# Patient Record
Sex: MALE | Race: BLACK OR AFRICAN AMERICAN | Employment: FULL TIME | URBAN - METROPOLITAN AREA
[De-identification: names, ages, dates, MRNs, and addresses within clinical notes are randomized per-mention and may not be internally consistent; named-entity substitution may affect disease eponyms.]

---

## 2019-11-03 ENCOUNTER — HOSPITAL ENCOUNTER (EMERGENCY)
Age: 49
Discharge: HOME | End: 2019-11-03
Attending: EMERGENCY MEDICINE
Payer: COMMERCIAL

## 2019-11-03 VITALS
OXYGEN SATURATION: 100 % | DIASTOLIC BLOOD PRESSURE: 77 MMHG | WEIGHT: 250 LBS | HEART RATE: 86 BPM | RESPIRATION RATE: 20 BRPM | SYSTOLIC BLOOD PRESSURE: 153 MMHG | HEIGHT: 72 IN | TEMPERATURE: 98.2 F | BODY MASS INDEX: 33.86 KG/M2

## 2019-11-03 DIAGNOSIS — R07.89 ATYPICAL CHEST PAIN: Primary | ICD-10-CM

## 2019-11-03 LAB
ANION GAP SERPL CALC-SCNC: 7 MMOL/L (ref 3–18)
BASOPHILS # BLD: 0 K/UL (ref 0–0.06)
BASOPHILS NFR BLD: 0 % (ref 0–3)
BUN SERPL-MCNC: 10 MG/DL (ref 7–18)
BUN/CREAT SERPL: 10 (ref 12–20)
CALCIUM SERPL-MCNC: 8.7 MG/DL (ref 8.5–10.1)
CHLORIDE SERPL-SCNC: 106 MMOL/L (ref 100–111)
CO2 SERPL-SCNC: 28 MMOL/L (ref 21–32)
CREAT SERPL-MCNC: 0.99 MG/DL (ref 0.6–1.3)
DIFFERENTIAL METHOD BLD: ABNORMAL
EOSINOPHIL # BLD: 0 K/UL (ref 0–0.4)
EOSINOPHIL NFR BLD: 0 % (ref 0–5)
ERYTHROCYTE [DISTWIDTH] IN BLOOD BY AUTOMATED COUNT: 14.9 % (ref 11.6–14.5)
GLUCOSE SERPL-MCNC: 97 MG/DL (ref 74–99)
HCT VFR BLD AUTO: 40.5 % (ref 36–48)
HGB BLD-MCNC: 13 G/DL (ref 13–16)
LYMPHOCYTES # BLD: 5.6 K/UL (ref 0.8–3.5)
LYMPHOCYTES NFR BLD: 54 % (ref 20–51)
MCH RBC QN AUTO: 24.9 PG (ref 24–34)
MCHC RBC AUTO-ENTMCNC: 32.1 G/DL (ref 31–37)
MCV RBC AUTO: 77.6 FL (ref 74–97)
MONOCYTES # BLD: 0.8 K/UL (ref 0–1)
MONOCYTES NFR BLD: 8 % (ref 2–9)
NEUTS SEG # BLD: 4 K/UL (ref 1.8–8)
NEUTS SEG NFR BLD: 38 % (ref 42–75)
PLATELET # BLD AUTO: 283 K/UL (ref 135–420)
PLATELET COMMENTS,PCOM: ABNORMAL
PMV BLD AUTO: 9.2 FL (ref 9.2–11.8)
POTASSIUM SERPL-SCNC: 3.1 MMOL/L (ref 3.5–5.5)
RBC # BLD AUTO: 5.22 M/UL (ref 4.7–5.5)
RBC MORPH BLD: ABNORMAL
SODIUM SERPL-SCNC: 141 MMOL/L (ref 136–145)
TROPONIN I SERPL-MCNC: <0.02 NG/ML (ref 0–0.04)
WBC # BLD AUTO: 10.4 K/UL (ref 4.6–13.2)

## 2019-11-03 PROCEDURE — 99284 EMERGENCY DEPT VISIT MOD MDM: CPT

## 2019-11-03 PROCEDURE — 93005 ELECTROCARDIOGRAM TRACING: CPT

## 2019-11-03 PROCEDURE — 85025 COMPLETE CBC W/AUTO DIFF WBC: CPT

## 2019-11-03 PROCEDURE — 80048 BASIC METABOLIC PNL TOTAL CA: CPT

## 2019-11-03 PROCEDURE — 84484 ASSAY OF TROPONIN QUANT: CPT

## 2019-11-03 NOTE — ED NOTES
Francisco Nobles is a 54 y.o. male that was discharged in stable condition. The patients diagnosis, condition and treatment were explained to  patient and aftercare instructions were given. The patient verbalized understanding. Patient armband removed and shredded.

## 2019-11-03 NOTE — ED PROVIDER NOTES
HPI patient complains of a several week history of episodic burning and pressure sensation in the middle of his chest.  He says it feels like indigestion. Is usually worse after he eats and is worse at night when he lays down. Symptoms are better if he takes some over-the-counter antacid or better if he is up walking around. He said he was thinking about making an appointment with her primary care physician because this been bothering him for so long but that he is decided rather come in the emergency room to be evaluated he said today he had an episode of this that lasted about a minute or 2 and then resolved. Currently he is symptom-free. He denies any nausea vomiting denies any difficulty breathing denies any diaphoresis. No other complaints given at this time. History reviewed. No pertinent past medical history. History reviewed. No pertinent surgical history. History reviewed. No pertinent family history.     Social History     Socioeconomic History    Marital status:      Spouse name: Not on file    Number of children: Not on file    Years of education: Not on file    Highest education level: Not on file   Occupational History    Not on file   Social Needs    Financial resource strain: Not on file    Food insecurity:     Worry: Not on file     Inability: Not on file    Transportation needs:     Medical: Not on file     Non-medical: Not on file   Tobacco Use    Smoking status: Never Smoker    Smokeless tobacco: Never Used   Substance and Sexual Activity    Alcohol use: Never     Frequency: Never    Drug use: Never    Sexual activity: Not on file   Lifestyle    Physical activity:     Days per week: Not on file     Minutes per session: Not on file    Stress: Not on file   Relationships    Social connections:     Talks on phone: Not on file     Gets together: Not on file     Attends [employer] service: Not on file     Active member of club or organization: Not on file Attends meetings of clubs or organizations: Not on file     Relationship status: Not on file    Intimate partner violence:     Fear of current or ex partner: Not on file     Emotionally abused: Not on file     Physically abused: Not on file     Forced sexual activity: Not on file   Other Topics Concern    Not on file   Social History Narrative    Not on file         ALLERGIES: Patient has no known allergies. Review of Systems   Constitutional: Negative. HENT: Negative. Eyes: Negative. Respiratory: Negative. Cardiovascular: Positive for chest pain. Gastrointestinal: Negative. Genitourinary: Negative. Musculoskeletal: Negative. Neurological: Negative. Psychiatric/Behavioral: Negative. Vitals:    11/03/19 1805   BP: 174/86   Pulse: (!) 104   Resp: 20   Temp: 98.2 °F (36.8 °C)   SpO2: 100%   Weight: 113.4 kg (250 lb)   Height: 6' (1.829 m)            Physical Exam   Constitutional: He is oriented to person, place, and time. He appears well-developed and well-nourished. HENT:   Head: Normocephalic and atraumatic. Mouth/Throat: Oropharynx is clear and moist.   Eyes: Pupils are equal, round, and reactive to light. EOM are normal.   Neck: Neck supple. Cardiovascular: Normal rate and regular rhythm. Pulmonary/Chest: Effort normal and breath sounds normal.   Abdominal: Soft. Musculoskeletal: Normal range of motion. Neurological: He is alert and oriented to person, place, and time. Skin: Skin is warm and dry. Psychiatric: He has a normal mood and affect. Nursing note and vitals reviewed. MDM         Procedures                 EKG was read by myself at 1806 p.m. showing sinus tachycardia at a rate of 103 with no acute changes        I reviewed the results of the ER evaluation with the patient who understands and agrees with the disposition and follow-up plan. He would like a referral to her primary care physician we will provide this for him.   Domenic Reaves MD 6:50 PM

## 2019-11-03 NOTE — DISCHARGE INSTRUCTIONS

## 2019-11-04 LAB
ATRIAL RATE: 103 BPM
CALCULATED P AXIS, ECG09: 69 DEGREES
CALCULATED R AXIS, ECG10: 59 DEGREES
CALCULATED T AXIS, ECG11: 0 DEGREES
DIAGNOSIS, 93000: NORMAL
P-R INTERVAL, ECG05: 168 MS
Q-T INTERVAL, ECG07: 330 MS
QRS DURATION, ECG06: 90 MS
QTC CALCULATION (BEZET), ECG08: 432 MS
VENTRICULAR RATE, ECG03: 103 BPM

## 2021-10-19 ENCOUNTER — HOSPITAL ENCOUNTER (OUTPATIENT)
Dept: PHYSICAL THERAPY | Age: 51
Discharge: HOME | End: 2021-10-19
Payer: COMMERCIAL

## 2021-10-19 PROCEDURE — 97530 THERAPEUTIC ACTIVITIES: CPT

## 2021-10-19 PROCEDURE — 97161 PT EVAL LOW COMPLEX 20 MIN: CPT

## 2021-10-19 PROCEDURE — 97110 THERAPEUTIC EXERCISES: CPT

## 2021-10-19 NOTE — PROGRESS NOTES
PT DAILY TREATMENT NOTE/LUMBAR EVAL     Patient Name: Oxana Kim  Date:10/19/2021  : 1970  [x]  Patient  Verified  Payor: Myriam Dumont / Plan: [facility] [employer] HMO/CHOICE PLUS/POS / Product Type: HMO /    In time: 2:23P  Out time: 3:00P  Total Treatment Time (min): 37  Visit #: 1 of 10    Treatment Area: Low back pain [M54.50]  SUBJECTIVE  Pain Level (0-10 scale): 5  []constant [x]intermittent [x]improving []worsening []no change since onset     Any medication changes, allergies to medications, adverse drug reactions, diagnosis change, or new procedure performed?: [x] No    [] Yes (see summary sheet for update)  Subjective functional status/changes:     PLOF: Ind/pain free performing ADL/IADLs, self care tasks, vocational responsibilities, sitting extended durations and recreational activities (enjoys playing tennis)  Limitations to PLOF: Increased Pain, Decreased sitting tolerance (<30 min.)  Mechanism of Injury: Reports insidious onset of LBP (starting around 18 yrs ago) - correlated to working out/lifting weights. Reporting most recent flare up beginning around 1 month ago - correlated to sleeping on uncomfortable mattress at mother and law's home   Current symptoms/Complaints: Describes pain as sharp located at low back into B hips (right>left)  Has experienced numbness in past 18 yrs going to right LE, however has not experienced this (and/or any other radicular ss/sx) recently. Aggravating factors include sitting extended periods of time and particular movements (including bending). Easing factors include stretching   Previous Treatment/Compliance: Has not received PT services for current symptoms. PMHx/Surgical Hx: N/A   Work Hx: Full Time - [employer] (limited in sitting extended durations)  Pt Goals: \"less pain/more movement\"      OBJECTIVE/EXAMINATION     17 min [x]????????????? ?? Eval                  []????????????? ? ? Re-Eval       10 min Therapeutic Exercise:  [x]??????????????? See flow sheet :   Rationale: increase ROM and increase strength to improve the patients ability to perform ADLs with greater ease     10 min Therapeutic Activity:  []???????????????  See flow sheet :   Rationale: pt awarness/education of patho and HEP  to improve the patients ability to return to PLOF                                                                                                        With   [x]? ??? TE   [x]? ??? TA   []???? neuro   []???? other: Patient Education: [x]???? Review HEP    []???? Progressed/Changed HEP based on:   []???? positioning   []???? body mechanics   []???? transfers   []???? heat/ice application    []???? other:          Posture/Functional Sit<>Stand: Fwd head/trunk, B rounded shlds, Increased Lumbar Lordosis, Decreased right LE WB/push off     Active Movements: []??? N/A   []? ?? Too acute   []? ?? Other:  ROM % AROM Comments:pain, area   Forward flexion 40-60 75 \"tight\"   Extension 20-30 50  pain   SB right 20-30 50 pain   SB left 20-30 50  pain   Rotation right 5-10 50  pain   Rotation left 5-10 50  pain       Palpation  []? ?? Min  []??? Mod  [x]? ?? Severe    Location: B lumbar paraspinals and glute med (right >left)     Strength    L(0-5) R (0-5) N/T   Hip Flexion (L1,2) 4 3+ []? ??    Knee Extension (L3,4) 4 3+ []? ??    Ankle Dorsiflexion (L4) 4+ 4- []???    Ankle Plantarflexion (S1) 4- 4- []???    Knee Flexion (S1,2) 4- 4- []???    B Hip IR/ER: 4/5  Left Hip Abd/Ext: 4-/5  Right Hip Abd/Ext: 3+/5                                 Core Strength:  Inability to maintain PPT w/ DL and/or SL lowering on plinth                            Special Tests                          Fozia/Fadir:              [x]? ? R    [x]? ? L    []? ? +    [x]? ? -                           SLR:                [x]? ? R    [x]? ? L    []? ? +    [x]? ? -                                   SLS/Stability/Balance:      Right LE: 20 sec      Left LE: 7 sec Joint Mobility: NT secondary to TC      Pain Level (0-10 scale) post treatment: 5    ASSESSMENT/Changes in Function: See POC     Patient will continue to benefit from skilled PT services to modify and progress therapeutic interventions, address functional mobility deficits, address ROM deficits, address strength deficits, analyze and address soft tissue restrictions, analyze and cue movement patterns, analyze and modify body mechanics/ergonomics, assess and modify postural abnormalities and address imbalance/dizziness to attain remaining goals.      [x]  See Plan of Care  []  See progress note/recertification  []  See Discharge Summary         Progress towards goals / Updated goals:  See POC     PLAN  [x]  Upgrade activities as tolerated     [x]  Continue plan of care  []  Update interventions per flow sheet       []  Discharge due to:_  []  Other:_      Tonya De Leon DPT 10/19/2021  1:19 PM

## 2021-10-19 NOTE — PROGRESS NOTES
[facility] [address] [facility] [address], [address] [phone] fax    Plan of Care/ Statement of Necessity for Physical Therapy Services    Patient name: Nikole Manriquez Start of Care: 10/19/2021   Referral source: Eugenio Mart MD : 1970    Medical Diagnosis: Low back pain [M54.50]  Payor: Teddy Pair / Plan: [address] HMO/CHOICE PLUS/POS / Product Type: HMO /    Onset Date:ongoing chronic 18+ yrs; most recent flare up 1 month ago    Treatment Diagnosis:  Low Back Pain    Prior Hospitalization: see medical history Provider#: [id number]   Medications: Verified on Patient summary List    Comorbidities: Pt reports: none    Prior Level of Function: Ind/pain free performing ADL/IADLs, self care tasks, vocational responsibilities, sitting extended durations and recreational activities (enjoys playing tennis)         The Plan of Care and following information is based on the information from the initial evaluation. Assessment/ key information:   Patient presents to clinic secondary to insidious onset of subacute on chronic low back pain. Todays clinical examination demonstrates soft tissue restrictions through B lumbar paraspinals and glute med (right> left), decreased core strength, decreased function (evident by FOTO score), stability,  Strength (right>left), AROM, flexibility, and overall mobility limitations/compensatory movement patterns. These limitations affect the patient's ability to sit extended durations, perform leisure activities,  ADLs/IADLs, self care tasks and vocational responsibilities efficiently and pain free.  The patient would benefit from skilled physical therapy interventions to address the aforementioned impairments in order to return to his PLOF of completing all functional activities pain free and independently.        Evaluation Complexity History LOW Complexity : Zero comorbidities / personal factors that will impact the outcome / POC; Examination MEDIUM Complexity : 3 Standardized tests and measures addressing body structure, function, activity limitation and / or participation in recreation  ;Presentation MEDIUM Complexity : Evolving with changing characteristics  ; Clinical Decision Making MEDIUM Complexity : FOTO score of 26-74  Overall Complexity Rating: LOW   Problem List: pain affecting function, decrease ROM, decrease strength, impaired gait/ balance, decrease ADL/ functional abilitiies, decrease activity tolerance, decrease flexibility/ joint mobility and decrease transfer abilities   Treatment Plan may include any combination of the following: Therapeutic exercise, Therapeutic activities, Neuromuscular re-education, Physical agent/modality, Gait/balance training, Manual therapy, Patient education, Self Care training, Functional mobility training, Home safety training and Stair training  Patient / Family readiness to learn indicated by: asking questions, trying to perform skills and interest  Persons(s) to be included in education: patient (P)  Barriers to Learning/Limitations: None  Patient Goal (s): less pain/more movement  Patient Self Reported Health Status: good  Rehabilitation Potential: good      Short Term Goals: To be accomplished in 2 treatments:   1. Patient will become proficient in their HEP and will be compliant in performing that program.   Evaluation: HEP established.      Long Term Goals: To be accomplished in 10 treatments:   1. Patient's pain level will be 0-1/10 with activity in order to improve patient's ability to perform normal ADLs.    Evaluation: 2-5/10 with activity   2. Patient will increase FOTO score to >/= 74 points to indicate increased functional mobility.   Evaluation: 64 points   3. Patient will demonstrate right hip abd/ext MMT >/= 4+/5 to perform recreational tasks at [facility]  Evaluation: right hip abd/ext MMT: 3+/5  4.  Patient will demonstrate pain free B lumbar Rotation AROM WNL to increase ease of ADLs.    Evaluation: >50% limited with B lumbar rotation w/ report of pain at end range  5. Patient will report ability to sit >/= 90 minutes to increase functional activity level with recreational participation.    Evaluation: Standing tolerance <30 minutes. Frequency / Duration: Patient to be seen 2 times per week for 10 treatments. Patient/ Caregiver education and instruction: Diagnosis, prognosis, self care, activity modification and exercises   [x]  Plan of care has been reviewed with EULA Jean-Baptiste DPT 10/19/2021 8:37 AM    ________________________________________________________________________    I certify that the above Therapy Services are being furnished while the patient is under my care. I agree with the treatment plan and certify that this therapy is necessary.     Physician's Signature:____________Date:_________TIME:________     Manoj Wilson MD  ** Signature, Date and Time must be completed for valid certification **    Please sign and return to [facility] [address], [address]  [phone] fax

## 2021-10-21 ENCOUNTER — HOSPITAL ENCOUNTER (OUTPATIENT)
Dept: PHYSICAL THERAPY | Age: 51
Discharge: HOME | End: 2021-10-21
Payer: COMMERCIAL

## 2021-10-21 PROCEDURE — 97110 THERAPEUTIC EXERCISES: CPT

## 2021-10-21 PROCEDURE — 97112 NEUROMUSCULAR REEDUCATION: CPT

## 2021-10-21 NOTE — PROGRESS NOTES
PT DAILY TREATMENT NOTE     Patient Name: Judge Perez  Date:10/21/2021  : 1970  [x]  Patient  Verified  Payor: Wilton King / Plan: [facility] HMO/CHOICE PLUS/POS / Product Type: HMO /    In time:1202  Out time:1247  Total Treatment Time (min): 45  Visit #: 2 of 10    Treatment Area: Low back pain [M54.50]    SUBJECTIVE  Pain Level (0-10 scale): 3-4  Any medication changes, allergies to medications, adverse drug reactions, diagnosis change, or new procedure performed?: [x] No    [] Yes (see summary sheet for update)  Subjective functional status/changes:   [] No changes reported  Patient reports the stretches have been helping; he's doing them every day. He reports minimal pain today. OBJECTIVE    30 min Therapeutic Exercise:  [x] See flow sheet :   Rationale: increase ROM and increase strength to improve the patients ability to increase activity tolerance    15 min Neuromuscular Re-education:  [x]  See flow sheet : core stabilization, glut re-ed   Rationale: increase strength, improve coordination, improve balance and increase proprioception  to improve the patients ability to tolerate sustained postures          With   [] TE   [] TA   [] neuro   [] other: Patient Education: [x] Review HEP    [] Progressed/Changed HEP based on:   [] positioning   [] body mechanics   [] transfers   [] heat/ice application    [] other:      Other Objective/Functional Measures: Initiated exercises per flow sheet     Pain Level (0-10 scale) post treatment: 0    ASSESSMENT/Changes in Function: Initiated exercise program to which patient responded well. He reports daily compliance with HEP and states stretches have been improving his LBP. He reported feeling more loose and had no pain at rest following session. Continue to progress as tolerated.     Patient will continue to benefit from skilled PT services to modify and progress therapeutic interventions, address functional mobility deficits, address ROM deficits, address strength deficits, analyze and address soft tissue restrictions, analyze and cue movement patterns, analyze and modify body mechanics/ergonomics, assess and modify postural abnormalities, address imbalance/dizziness and instruct in home and community integration to attain remaining goals. []  See Plan of Care  []  See progress note/recertification  []  See Discharge Summary         Progress towards goals / Updated goals:  Short Term Goals: To be accomplished in 2 treatments:   1. Patient will become proficient in their HEP and will be compliant in performing that program.   Evaluation: HEP established.     Reports 2x/daily compliance  Long Term Goals: To be accomplished in 10 treatments:   1. Patient's pain level will be 0-1/10 with activity in order to improve patient's ability to perform normal ADLs.    Evaluation: 2-5/10 with activity   2. Patient will increase FOTO score to >/= 74 points to indicate increased functional mobility.   Evaluation: 64 points   3. Patient will demonstrate right hip abd/ext MMT >/= 4+/5 to perform recreational tasks at [facility]  Evaluation: right hip abd/ext MMT: 3+/5  4. Patient will demonstrate pain free B lumbar Rotation AROM WNL to increase ease of ADLs.    Evaluation: >50% limited with B lumbar rotation w/ report of pain at end range  5. Patient will report ability to sit >/= 90 minutes to increase functional activity level with recreational participation.    Evaluation: Standing tolerance <30 minutes.     PLAN  [x]  Upgrade activities as tolerated     [x]  Continue plan of care  []  Update interventions per flow sheet       []  Discharge due to:_  []  Other:_      Deniz Sick, PTA 10/21/2021  11:38 AM    Future Appointments   Date Time Provider Irina Pulido   10/21/2021 12:00 PM Courtney Rice [facility]   10/25/2021  1:30 [facility]   10/28/2021 12:00 PM [facility]

## 2021-10-25 ENCOUNTER — HOSPITAL ENCOUNTER (OUTPATIENT)
Dept: PHYSICAL THERAPY | Age: 51
Discharge: HOME | End: 2021-10-25
Payer: COMMERCIAL

## 2021-10-25 PROCEDURE — 97110 THERAPEUTIC EXERCISES: CPT

## 2021-10-25 PROCEDURE — 97530 THERAPEUTIC ACTIVITIES: CPT

## 2021-10-25 PROCEDURE — 97112 NEUROMUSCULAR REEDUCATION: CPT

## 2021-10-25 NOTE — PROGRESS NOTES
PT DAILY TREATMENT NOTE     Patient Name: Maria Del Rosario Abraham  Date:10/25/2021  : 1970  [x]  Patient  Verified  Payor: Kathrin Brady / Plan: [facility] HMO/CHOICE PLUS/POS / Product Type: HMO /    In time:130  Out time:212  Total Treatment Time (min): 42  Visit #: 3 of 10    Treatment Area: Low back pain [M54.50]    SUBJECTIVE  Pain Level (0-10 scale): 3  Any medication changes, allergies to medications, adverse drug reactions, diagnosis change, or new procedure performed?: [x] No    [] Yes (see summary sheet for update)  Subjective functional status/changes:   [] No changes reported  Pt reports he thinks he hyperextended his knee on one of his exercises at home and is having some right posterior knee pain    OBJECTIVE    9 min Therapeutic Exercise:  [] See flow sheet :   Rationale: increase ROM and increase strength to improve the patients ability to perform ADLs    10 min Therapeutic Activity:  [x]  See flow sheet : functional hip strength   Rationale: increase strength and improve coordination  to improve the patients ability to negotiate home and community      23 min Neuromuscular Re-education:  [x]  See flow sheet : core stabilization   Rationale: increase strength, improve coordination, improve balance and increase proprioception  to improve the patients ability to tolerate sustained postures          With   [] TE   [] TA   [] neuro   [] other: Patient Education: [x] Review HEP    [] Progressed/Changed HEP based on:   [] positioning   [] body mechanics   [] transfers   [] heat/ice application    [] other:      Other Objective/Functional Measures: progressed exercises per flow sheet      Pain Level (0-10 scale) post treatment: 0    ASSESSMENT/Changes in Function: Pt tolerated progression of exercises with out increase in symptoms. Deferred exercises with knee flexion due to pt pain complaints; quick assessment suggests possible popliteus strain.  Will monitor knee pain symptoms and continue to assess if needed. Patient will continue to benefit from skilled PT services to modify and progress therapeutic interventions, address functional mobility deficits, address ROM deficits, address strength deficits, analyze and address soft tissue restrictions, analyze and cue movement patterns, analyze and modify body mechanics/ergonomics, assess and modify postural abnormalities, address imbalance/dizziness and instruct in home and community integration to attain remaining goals. []  See Plan of Care  []  See progress note/recertification  []  See Discharge Summary         Progress towards goals / Updated goals:  Short Term Goals: To be accomplished in 2 treatments:   1. Patient will become proficient in their HEP and will be compliant in performing that program.    Evaluation: HEP established.               Reports 2x/daily compliance    Long Term Goals: To be accomplished in 10 treatments:   1. Patient's pain level will be 0-1/10 with activity in order to improve patient's ability to perform normal ADLs.     Evaluation: 2-5/10 with activity    No significant change at this time, 3/10 upon arrival  2. Patient will increase FOTO score to >/= 74 points to indicate increased functional mobility.    Evaluation: 64 points    To be reassessed at end of POC  3. Patient will demonstrate right hip abd/ext MMT >/= 4+/5 to perform recreational tasks at [facility]   Evaluation: right hip abd/ext MMT: 3+/5   Tolerating progression of exercises  4. Patient will demonstrate pain free B lumbar Rotation AROM WNL to increase ease of ADLs.      Evaluation: >50% limited with B lumbar rotation w/ report of pain at end range  5. Patient will report ability to sit >/= 90 minutes to increase functional activity level with recreational participation.     Evaluation: Standing tolerance <30 minutes.       PLAN  [x]  Upgrade activities as tolerated     [x]  Continue plan of care  []  Update interventions per flow sheet []  Discharge due to:_  []  Other:_      Valeria Sánchez, PT 10/25/2021  1:36 PM    Future Appointments   Date Time Provider Irina Pulido   10/28/2021 12:00 PM [facility]

## 2021-10-28 ENCOUNTER — HOSPITAL ENCOUNTER (OUTPATIENT)
Dept: PHYSICAL THERAPY | Age: 51
Discharge: HOME | End: 2021-10-28
Payer: COMMERCIAL

## 2021-10-28 PROCEDURE — 97530 THERAPEUTIC ACTIVITIES: CPT

## 2021-10-28 PROCEDURE — 97110 THERAPEUTIC EXERCISES: CPT

## 2021-10-28 PROCEDURE — 97112 NEUROMUSCULAR REEDUCATION: CPT

## 2021-11-01 ENCOUNTER — HOSPITAL ENCOUNTER (OUTPATIENT)
Dept: PHYSICAL THERAPY | Age: 51
Discharge: HOME | End: 2021-11-01
Payer: COMMERCIAL

## 2021-11-01 PROCEDURE — 97110 THERAPEUTIC EXERCISES: CPT

## 2021-11-01 PROCEDURE — 97112 NEUROMUSCULAR REEDUCATION: CPT

## 2021-11-01 PROCEDURE — 97530 THERAPEUTIC ACTIVITIES: CPT

## 2021-11-01 NOTE — PROGRESS NOTES
PT DAILY TREATMENT NOTE     Patient Name: Reymundo Auguste  Date:2021  : 1970  [x]  Patient  Verified  Payor: Comfort Tripp / Plan: [facility] HMO/CHOICE PLUS/POS / Product Type: HMO /    In time:945  Out time:1035  Total Treatment Time (min): 50  Visit #: 5 of 10    Treatment Area: Low back pain [M54.50]    SUBJECTIVE  Pain Level (0-10 scale): 0  Any medication changes, allergies to medications, adverse drug reactions, diagnosis change, or new procedure performed?: [x] No    [] Yes (see summary sheet for update)  Subjective functional status/changes:   [] No changes reported  \"No pain. \"    OBJECTIVE    Modality rationale: patient declined    Min Type Additional Details    [] Estim:  []Unatt       []IFC  []Premod                        []Other:  []w/ice   []w/heat  Position:  Location:    [] Estim: []Att    []TENS instruct  []NMES                    []Other:  []w/US   []w/ice   []w/heat  Position:  Location:    []  Traction: [] Cervical       []Lumbar                       [] Prone          []Supine                       []Intermittent   []Continuous Lbs:  [] before manual  [] after manual    []  Ultrasound: []Continuous   [] Pulsed                           []1MHz   []3MHz W/cm2:  Location:    []  Iontophoresis with dexamethasone         Location: [] Take home patch   [] In clinic    []  Ice     []  heat  []  Ice massage  []  Laser   []  Anodyne Position:  Location:    []  Laser with stim  []  Other:  Position:  Location:    []  Vasopneumatic Device    []  Right     []  Left  Pre-treatment girth:  Post-treatment girth:  Measured at (location):  Pressure:       [] lo [] med [] hi   Temperature: [] lo [] med [] hi   [] Skin assessment post-treatment:  []intact []redness- no adverse reaction    []redness  adverse reaction:     10 min Therapeutic Exercise:  [x] See flow sheet :   Rationale: increase ROM and increase strength to improve the patients ability to perform ADLs    15 min Therapeutic Activity:  [x]  See flow sheet : squatting mechanics, functional standing activities    Rationale: increase ROM, increase strength, improve coordination, improve balance and increase proprioception  to improve the patients ability to improve mobility and ADL performance     25 min Neuromuscular Re-education:  [x]  See flow sheet : core stabilization activities    Rationale: increase ROM, increase strength, improve coordination, improve balance and increase proprioception  to improve the patients ability to improve mobility and upright posture        With   [x] TE   [x] TA   [x] neuro   [] other: Patient Education: [x] Review HEP    [] Progressed/Changed HEP based on:   [x] positioning   [x] body mechanics   [] transfers   [] heat/ice application    [] other:      Other Objective/Functional Measures:      Pain Level (0-10 scale) post treatment: 0    ASSESSMENT/Changes in Function: Pt was fatigued with progression of squatting and core stabilization activities today. He reports glut med fatigue by the end of treatment. Quick to fatigue with squatting, but demonstrated good form. Patient will continue to benefit from skilled PT services to modify and progress therapeutic interventions, address functional mobility deficits, address ROM deficits, address strength deficits, analyze and address soft tissue restrictions, analyze and cue movement patterns, analyze and modify body mechanics/ergonomics, assess and modify postural abnormalities, address imbalance/dizziness and instruct in home and community integration to attain remaining goals. [x]  See Plan of Care  []  See progress note/recertification  []  See Discharge Summary         Progress towards goals / Updated goals:  Short Term Goals:  To be accomplished in 2 treatments:   1. Patient will become proficient in their HEP and will be compliant in performing that program.               Evaluation: HEP established.               Reports 2x/daily compliance     Long Term Goals: To be accomplished in 10 treatments:   1. Patient's pain level will be 0-1/10 with activity in order to improve patient's ability to perform normal ADLs.                Evaluation: 2-5/10 with activity               MX significant change at this time, 3/10 upon arrival  2. Patient will increase FOTO score to >/= 74 points to indicate increased functional mobility.               Evaluation: 64 points               To be reassessed at end of POC  3. Patient will demonstrate right hip abd/ext MMT >/= 4+/5 to perform recreational tasks at [facility].                Evaluation: right hip abd/ext MMT: 3+/5              Tolerating progression of exercises  4. Patient will demonstrate pain free B lumbar Rotation AROM WNL to increase ease of ADLs.                Evaluation: >50% limited with B lumbar rotation w/ report of pain at end range              Rotation improving with decreased pain  5. Patient will report ability to sit >/= 90 minutes to increase functional activity level with recreational participation.                Evaluation: Standing tolerance <30 minutes.               Performed >30mins of standing functional activity today     PLAN  []  Upgrade activities as tolerated     [x]  Continue plan of care  []  Update interventions per flow sheet       []  Discharge due to:_  []  Other:_      Vickii Fruits, PTA, CSCS 11/1/2021  10:50 AM    Future Appointments   Date Time Provider Irina Pulido   11/4/2021 12:00 PM [facility]   11/8/2021 12:00 PM Saint [facility]   11/11/2021 12:00 PM Mul[facility]uss [facility]

## 2021-11-04 ENCOUNTER — HOSPITAL ENCOUNTER (OUTPATIENT)
Dept: PHYSICAL THERAPY | Age: 51
Discharge: HOME | End: 2021-11-04
Payer: COMMERCIAL

## 2021-11-04 PROCEDURE — 97530 THERAPEUTIC ACTIVITIES: CPT

## 2021-11-04 PROCEDURE — 97112 NEUROMUSCULAR REEDUCATION: CPT

## 2021-11-04 PROCEDURE — 97110 THERAPEUTIC EXERCISES: CPT

## 2021-11-04 NOTE — PROGRESS NOTES
PT DAILY TREATMENT NOTE     Patient Name: Angela Oneil  Date:2021  : 1970  [x]  Patient  Verified  Payor: Radha Fajardo / Plan: [facility] HMO/CHOICE PLUS/POS / Product Type: HMO /    In time:12:02  Out time:12:49  Total Treatment Time (min): 52  Visit #: 6 of 10    Treatment Area: Low back pain [M54.50]    SUBJECTIVE  Pain Level (0-10 scale): 0  Any medication changes, allergies to medications, adverse drug reactions, diagnosis change, or new procedure performed?: [x] No    [] Yes (see summary sheet for update)  Subjective functional status/changes:   [] No changes reported  Pt reports hyperextending his knee this morning and it was bothering him earlier but feels better now    OBJECTIVE  24 min Therapeutic Exercise:  [x] See flow sheet :   Rationale: increase ROM and increase strength to improve the patients ability to perform ADLs    8 min Therapeutic Activity:  [x]  See flow sheet :   Rationale: increase strength and improve coordination  to improve the patients ability to increase ease with daily tasks     15 min Neuromuscular Re-education:  [x]  See flow sheet :   Rationale: increase strength and improve coordination  to improve the patients ability to perform functional tasks            With   [] TE   [] TA   [] neuro   [] other: Patient Education: [x] Review HEP    [] Progressed/Changed HEP based on:   [] positioning   [] body mechanics   [] transfers   [] heat/ice application    [] other:      Other Objective/Functional Measures:      Pain Level (0-10 scale) post treatment: 0    ASSESSMENT/Changes in Function:Demo's glute fatigue with lateral band walk and clamx3. Vc's to maintain TA activation with therex and for proper breathing. Challenged well with therex and denies pain following treatment.      Patient will continue to benefit from skilled PT services to modify and progress therapeutic interventions, address functional mobility deficits, address ROM deficits, address strength deficits, analyze and address soft tissue restrictions, analyze and cue movement patterns, analyze and modify body mechanics/ergonomics and assess and modify postural abnormalities to attain remaining goals. []  See Plan of Care  []  See progress note/recertification  []  See Discharge Summary         Progress towards goals / Updated goals:  Short Term Goals: To be accomplished in 2 treatments:   1. Patient will become proficient in their HEP and will be compliant in performing that program.               Evaluation: HEP established.               Reports 2x/daily compliance     Long Term Goals: To be accomplished in 10 treatments:   1. Patient's pain level will be 0-1/10 with activity in order to improve patient's ability to perform normal ADLs.                Evaluation: 2-5/10 with activity               TF significant change at this time, 3/10 upon arrival  2. Patient will increase FOTO score to >/= 74 points to indicate increased functional mobility.               Evaluation: 64 points               To be reassessed at end of POC  3. Patient will demonstrate right hip abd/ext MMT >/= 4+/5 to perform recreational tasks at [facility].                Evaluation: right hip abd/ext MMT: 3+/5              Tolerating progression of exercises  4. Patient will demonstrate pain free B lumbar Rotation AROM WNL to increase ease of ADLs.                 Evaluation: >50% limited with B lumbar rotation w/ report of pain at end range              Rotation improving with decreased pain  5. Patient will report ability to sit >/= 90 minutes to increase functional activity level with recreational participation.                Evaluation: Standing tolerance <30 minutes.              Performed >30mins of standing functional activity today     PLAN  []  Upgrade activities as tolerated     [x]  Continue plan of care  []  Update interventions per flow sheet       []  Discharge due to:_  []  Other:_      Eduar Vo EULA 11/4/2021  12:04 PM    Future Appointments   Date Time Provider Irina Pulido   11/8/2021 12:00 PM [facility]   11/11/2021 12:00 PM CAIN Prater [facility]

## 2021-11-08 ENCOUNTER — HOSPITAL ENCOUNTER (OUTPATIENT)
Dept: PHYSICAL THERAPY | Age: 51
Discharge: HOME | End: 2021-11-08
Payer: COMMERCIAL

## 2021-11-08 PROCEDURE — 97112 NEUROMUSCULAR REEDUCATION: CPT

## 2021-11-08 PROCEDURE — 97110 THERAPEUTIC EXERCISES: CPT

## 2021-11-08 PROCEDURE — 97530 THERAPEUTIC ACTIVITIES: CPT

## 2021-11-08 NOTE — PROGRESS NOTES
PT DAILY TREATMENT NOTE     Patient Name: Judge Black  Date:2021  : 1970  [x]  Patient  Verified  Payor: Gregory Haque / Plan: [facility] HMO/CHOICE PLUS/POS / Product Type: HMO /    In time:1210  Out time:1245  Total Treatment Time (min): 35  Visit #: 7 of 10    Treatment Area: Low back pain [M54.50]    SUBJECTIVE  Pain Level (0-10 scale): 0  Any medication changes, allergies to medications, adverse drug reactions, diagnosis change, or new procedure performed?: [x] No    [] Yes (see summary sheet for update)  Subjective functional status/changes:   [] No changes reported  \"No pain. \"    OBJECTIVE    Modality rationale: patient declined   Min Type Additional Details    [] Estim:  []Unatt       []IFC  []Premod                        []Other:  []w/ice   []w/heat  Position:  Location:    [] Estim: []Att    []TENS instruct  []NMES                    []Other:  []w/US   []w/ice   []w/heat  Position:  Location:    []  Traction: [] Cervical       []Lumbar                       [] Prone          []Supine                       []Intermittent   []Continuous Lbs:  [] before manual  [] after manual    []  Ultrasound: []Continuous   [] Pulsed                           []1MHz   []3MHz W/cm2:  Location:    []  Iontophoresis with dexamethasone         Location: [] Take home patch   [] In clinic    []  Ice     []  heat  []  Ice massage  []  Laser   []  Anodyne Position:  Location:    []  Laser with stim  []  Other:  Position:  Location:    []  Vasopneumatic Device    []  Right     []  Left  Pre-treatment girth:  Post-treatment girth:  Measured at (location):  Pressure:       [] lo [] med [] hi   Temperature: [] lo [] med [] hi   [] Skin assessment post-treatment:  []intact []redness- no adverse reaction    []redness  adverse reaction:     10 min Therapeutic Exercise:  [x] See flow sheet :   Rationale: increase ROM and increase strength to improve the patients ability to perform ADLs    10 min Therapeutic Activity:  [x]  See flow sheet : squatting mechanics, functional standing activities    Rationale: increase ROM, increase strength, improve coordination, improve balance and increase proprioception  to improve the patients ability to improve mobility and ADL performance     15 min Neuromuscular Re-education:  [x]  See flow sheet : core stabilization activities    Rationale: increase ROM, increase strength, improve coordination, improve balance and increase proprioception  to improve the patients ability to improve mobility and upright posture         With   [x] TE   [x] TA   [x] neuro   [] other: Patient Education: [x] Review HEP    [] Progressed/Changed HEP based on:   [x] positioning   [x] body mechanics   [] transfers   [] heat/ice application    [] other:      Other Objective/Functional Measures:      Pain Level (0-10 scale) post treatment: 0    ASSESSMENT/Changes in Function: Pt is making progress with his lifting mechanics and core stability. Pain is well controlled recently. Held on several exercises today secondary to patient running late to appointment. Patient will continue to benefit from skilled PT services to modify and progress therapeutic interventions, address functional mobility deficits, address ROM deficits, address strength deficits, analyze and address soft tissue restrictions, analyze and cue movement patterns, analyze and modify body mechanics/ergonomics, assess and modify postural abnormalities, address imbalance/dizziness and instruct in home and community integration to attain remaining goals. [x]  See Plan of Care  []  See progress note/recertification  []  See Discharge Summary         Progress towards goals / Updated goals:  Short Term Goals:  To be accomplished in 2 treatments:   1. Patient will become proficient in their HEP and will be compliant in performing that program.               Evaluation: HEP established.               Reports 2x/daily compliance     Long Term Goals: To be accomplished in 10 treatments:   1. Patient's pain level will be 0-1/10 with activity in order to improve patient's ability to perform normal ADLs.                Evaluation: 2-5/10 with activity               TF significant change at this time, 3/10 upon arrival  2. Patient will increase FOTO score to >/= 74 points to indicate increased functional mobility.               Evaluation: 64 points               To be reassessed at end of POC  3. Patient will demonstrate right hip abd/ext MMT >/= 4+/5 to perform recreational tasks at [facility].                Evaluation: right hip abd/ext MMT: 3+/5              Tolerating progression of exercises  4. Patient will demonstrate pain free B lumbar Rotation AROM WNL to increase ease of ADLs.                 Evaluation: >50% limited with B lumbar rotation w/ report of pain at end range              Rotation improving with decreased pain  5. Patient will report ability to sit >/= 90 minutes to increase functional activity level with recreational participation.                Evaluation: Standing tolerance <30 minutes.              Performed >30mins of standing functional activity today     PLAN  []  Upgrade activities as tolerated     [x]  Continue plan of care  []  Update interventions per flow sheet       []  Discharge due to:_  []  Other:_      Datil Oats, PTA, CSCS 11/8/2021  1:13 PM    Future Appointments   Date Time Provider Irina Pulido   11/8/2021 12:00 PM [facility]   11/11/2021 12:00 PM CAIN BeaversHS [facility]

## 2021-11-11 ENCOUNTER — HOSPITAL ENCOUNTER (OUTPATIENT)
Dept: PHYSICAL THERAPY | Age: 51
Discharge: HOME | End: 2021-11-11
Payer: COMMERCIAL

## 2021-11-11 PROCEDURE — 97110 THERAPEUTIC EXERCISES: CPT

## 2021-11-11 PROCEDURE — 97530 THERAPEUTIC ACTIVITIES: CPT

## 2021-11-11 NOTE — PROGRESS NOTES
PT DAILY TREATMENT NOTE     Patient Name: Oxana Kim  Date:2021  : 1970  [x]  Patient  Verified  Payor: Myriam Dumont / Plan: [facility] [employer] HMO/CHOICE PLUS/POS / Product Type: HMO /    In time:12:13P  Out time:12:40P  Total Treatment Time (min): 27  Visit #: 8 of 10    Treatment Area: Other low back pain [M54.59]    SUBJECTIVE  Pain Level (0-10 scale): 0  Any medication changes, allergies to medications, adverse drug reactions, diagnosis change, or new procedure performed?: [x] No    [] Yes (see summary sheet for update)  Subjective functional status/changes:   [] No changes reported  Patient reports a 85% improvement in current condition since beginning skilled PT services. Reports improvements in overall pain intensity, however continues to be limited in sitting prolonged durations. OBJECTIVE    10 min Therapeutic Exercise:  [x] See flow sheet :   Rationale: increase ROM and increase strength to improve the patients ability to perform ADLs    17 min Therapeutic Activity:  [x]  See flow sheet : squatting mechanics, functional standing activities; FOTO; Goal Reassessment; Pt Ed   Rationale: increase ROM, increase strength, improve coordination, improve balance and increase proprioception  to improve the patients ability to improve mobility and ADL performance          With   [x] TE   [x] TA   [x] neuro   [] other: Patient Education: [x] Review HEP    [] Progressed/Changed HEP based on:   [x] positioning   [x] body mechanics   [] transfers   [] heat/ice application    [] other:      Other Objective/Functional Measures:   See Goals Below  Limited session secondary to TC     Pain Level (0-10 scale) post treatment: 0    ASSESSMENT/Changes in Function:   Patient reports a 85% improvement in current symptoms since beginning skilled PT services. Reports improvements in overall pain intensity, however continues to be limited in sitting prolonged durations.  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 Patient will continue to benefit from skilled PT services to modify and progress therapeutic interventions, address functional mobility deficits, address ROM deficits, address strength deficits, analyze and address soft tissue restrictions, analyze and cue movement patterns, analyze and modify body mechanics/ergonomics, assess and modify postural abnormalities, address imbalance/dizziness and instruct in home and community integration to attain remaining goals. []  See Plan of Care  [x]  See progress note/recertification  []  See Discharge Summary         Progress towards goals / Updated goals:  Short Term Goals: To be accomplished in 2 treatments:   1. Patient will become proficient in their HEP and will be compliant in performing that program. `v            Evaluation: HEP established.               MET: Reports 2x/daily compliance     Long Term Goals: To be accomplished in 10 treatments:   1. Patient's pain level will be 0-1/10 with activity in order to improve patient's ability to perform normal ADLs.                Evaluation: 2-5/10 with activity              Current: Progressin-5/10    2. Patient will increase FOTO score to >/= 74 points to indicate increased functional mobility.               Evaluation: 64 points              Current: Regressed: 63 points     3. Patient will demonstrate right hip abd/ext MMT >/= 4+/5 to perform recreational tasks at [facility].                Evaluation: right hip abd/ext MMT: 3+/5    Current: MET: right hip abd/ext MMT: 4+/5       4. Patient will demonstrate pain free B lumbar Rotation AROM WNL to increase ease of ADLs.                Evaluation: >50% limited with B lumbar rotation w/ report of pain at end range            Current: MET: lumbar AROM WNL, pain free    5. Patient will report ability to sit >/= 90 minutes to increase functional activity level with recreational participation.                Evaluation: sitting tolerance <30 minutes.             Current: Progressin min. Sitting tolerance      PLAN  [x]  Upgrade activities as tolerated     [x]  Continue plan of care  [x]  Update interventions per flow sheet       []  Discharge due to:_  []  Other:_      Андрей Medina DPT, CSCS 2021  1:13 PM    No future appointments.

## 2021-11-11 NOTE — PROGRESS NOTES
[facility] [place] [address]  [address], [address] [phone] fax    Physician Update  [x] Progress Note  [] Discharge Summary    Patient name: Marylene Landsberg Start of Care: 10/19/2021   Referral source: Leobardo Marie MD : 1970   Medical/Treatment Diagnosis: Other low back pain [M54.59]  Payor: Heydianahy Brennay / Plan: [employer] H[employer]/CHOICE PLUS/POS / Product Type: HMO /  Onset Date:ongoing chronic 18+ yrs; most recent flare up 1 month ago         Prior Hospitalization: see medical history Provider#: [id number]   Medications: Verified on Patient Summary List    Comorbidities: Pt reports: none   Prior Level of Function:Ind/pain free performing ADL/IADLs, self care tasks, vocational responsibilities, sitting extended durations and recreational activities (enjoys playing tennis)  Visits from Start of Care: 8    Missed Visits: 0    Status at Evaluation/Last Progress Note:   Patient reports a 85% improvement in current symptoms since beginning skilled PT services. Reports improvements in overall pain intensity, however continues to be limited in sitting prolonged durations. 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   Patient will continue to benefit from skilled PT services to modify and progress therapeutic interventions, address functional mobility deficits, address ROM deficits, address strength deficits, analyze and address soft tissue restrictions, analyze and cue movement patterns, analyze and modify body mechanics/ergonomics, assess and modify postural abnormalities, address imbalance/dizziness and instruct in home and community integration to attain remaining goals. Progress towards Goals:   Short Term Goals: To be accomplished in 2 treatments:   1. Patient will become proficient in their HEP and will be compliant in performing that program. `v            Evaluation: HEP established.               MET: Reports 2x/daily compliance     Long Term Goals: To be accomplished in 10 treatments:   1. Patient's pain level will be 0-1/10 with activity in order to improve patient's ability to perform normal ADLs.                Evaluation: 2-5/10 with activity              Current: Progressin-5/10     2. Patient will increase FOTO score to >/= 74 points to indicate increased functional mobility.               Evaluation: 64 points              Current: Regressed: 63 points      3. Patient will demonstrate right hip abd/ext MMT >/= 4+/5 to perform recreational tasks at [facility].                Evaluation: right hip abd/ext MMT: 3+/5               Current: MET: right hip abd/ext MMT: 4+/5       4. Patient will demonstrate pain free B lumbar Rotation AROM WNL to increase ease of ADLs.                Evaluation: >50% limited with B lumbar rotation w/ report of pain at end range            Current: MET: lumbar AROM WNL, pain free     5. Patient will report ability to sit >/= 90 minutes to increase functional activity level with recreational participation.                Evaluation: sitting tolerance <30 minutes.             Current: Progressin min. Sitting tolerance     Goals: to be achieved in 4 weeks:   1. Patient's pain level will be 0-1/10 with activity in order to improve patient's ability to perform normal ADLs.              P/N: Progressin-5/10     2. Patient will increase FOTO score to >/= 74 points to indicate increased functional mobility.              P/N: Regressed: 63 points      3.  Patient will demonstrate right hip abd/ext MMT 5/5 to perform recreational tasks at [facility].               P/N: right hip abd/ext MMT: 4+/5 4. Patient will report ability to sit >/= 90 minutes to increase functional activity level with recreational participation.                P/N: Progressin min. Sitting tolerance       ASSESSMENT/RECOMMENDATIONS:  [x]Continue therapy per initial plan/protocol at a frequency of  1x per week for 4 weeks  []Continue therapy with the following recommended changes:_____________________      _____________________________________________________________________  []Discontinue therapy progressing towards or have reached established goals  []Discontinue therapy due to lack of appreciable progress towards goals  []Discontinue therapy due to lack of attendance or compliance  []Await Physician's recommendations/decisions regarding therapy  []Other:________________________________________________________________    Thank you for this referral.   Hannah Brambila DPT 2021 12:56 PM  NOTE TO PHYSICIAN:  PLEASE COMPLETE THE ORDERS BELOW AND   FAX TO [facility]: [phone]  If you are unable to process this request in 24 hours please contact our office: [phone]    []  I have read the above report and request that my patient continue as recommended. []  I have read the above report and request that my patient continue therapy with the following changes/special instructions:________________________________________  []I have read the above report and request that my patient be discharged from therapy.     Physician's Signature:____________Date:_________TIME:________     Eugenio Mart MD  ** Signature, Date and Time must be completed for valid certification **

## 2021-11-19 ENCOUNTER — APPOINTMENT (OUTPATIENT)
Dept: PHYSICAL THERAPY | Age: 51
End: 2021-11-19
Payer: COMMERCIAL

## 2021-12-16 NOTE — PROGRESS NOTES
[facility] [place] [address]  [address] [facility] [address], [address] [phone] fax    Discharge Summary  Patient name: Mike Wilcox Start of Care: 10/19/2021   Referral source: Leana Mcardle, MD : 1970   Medical/Treatment Diagnosis: Other low back pain [M54.59]  Payor: [email or web address] / Plan: [address] [employer] / Product Type: HMO /  Onset Date:ongoing chronic 18+ yrs; most recent flare up 1 month ago     Prior Hospitalization: see medical history Provider#: [id number]   Medications: Verified on Patient Summary List    Comorbidities: Pt reports: none   Prior Level of Function:Ind/pain free performing ADL/IADLs, self care tasks, vocational responsibilities, sitting extended durations and recreational activities (enjoys playing tennis)    Visits from Start of Care: 8    Missed Visits: 2    Reporting Period : 21 to 21    Unable to reassess final objective measure/LTGs secondary to formal D/C not completed   Goals: to be achieved in 4 weeks:   1. Patient's pain level will be 0-1/10 with activity in order to improve patient's ability to perform normal ADLs.              P/N: Progressin-5/10  2. Patient will increase FOTO score to >/= 74 points to indicate increased functional mobility.              P/N: Regressed: 63 points   3. Patient will demonstrate right hip abd/ext MMT 5/5 to perform recreational tasks at [facility].                P/N: right hip abd/ext MMT: 4+/5   4. Patient will report ability to sit >/= 90 minutes to increase functional activity level with recreational participation.                P/N: Progressin min. Sitting tolerance      Assessment/Summary of care:   Pt initially demonstrated significant improvement in functional status and overall condition, however will be discharged at this time secondary to clinic no show/cancellation policy (with inability to reach pt to schedule additional f/u appts). RECOMMENDATIONS:  [x]Discontinue therapy: []Patient has reached or is progressing toward set goals      [x]Patient is non-compliant or has abdicated      []Due to lack of appreciable progress towards set goals    Olga Garcia DPT 12/16/2021 10:08 AM